# Patient Record
Sex: FEMALE | Race: WHITE
[De-identification: names, ages, dates, MRNs, and addresses within clinical notes are randomized per-mention and may not be internally consistent; named-entity substitution may affect disease eponyms.]

---

## 2019-04-15 ENCOUNTER — HOSPITAL ENCOUNTER (OUTPATIENT)
Dept: HOSPITAL 62 - RAD | Age: 84
End: 2019-04-15
Attending: NURSE PRACTITIONER
Payer: MEDICARE

## 2019-04-15 DIAGNOSIS — E11.622: Primary | ICD-10-CM

## 2019-04-15 DIAGNOSIS — L97.212: ICD-10-CM

## 2019-04-15 LAB
ADD MANUAL DIFF: NO
ALBUMIN SERPL-MCNC: 3.5 G/DL (ref 3.5–5)
ALP SERPL-CCNC: 43 U/L (ref 38–126)
ALT SERPL-CCNC: 21 U/L (ref 9–52)
ANION GAP SERPL CALC-SCNC: 6 MMOL/L (ref 5–19)
AST SERPL-CCNC: 16 U/L (ref 14–36)
BASOPHILS # BLD AUTO: 0 10^3/UL (ref 0–0.2)
BASOPHILS NFR BLD AUTO: 0.4 % (ref 0–2)
BILIRUB DIRECT SERPL-MCNC: 0.2 MG/DL (ref 0–0.4)
BILIRUB SERPL-MCNC: 0.2 MG/DL (ref 0.2–1.3)
BUN SERPL-MCNC: 21 MG/DL (ref 7–20)
CALCIUM: 9.6 MG/DL (ref 8.4–10.2)
CHLORIDE SERPL-SCNC: 98 MMOL/L (ref 98–107)
CO2 SERPL-SCNC: 32 MMOL/L (ref 22–30)
CRP SERPL-MCNC: < 5 MG/L (ref ?–10)
EOSINOPHIL # BLD AUTO: 0.1 10^3/UL (ref 0–0.6)
EOSINOPHIL NFR BLD AUTO: 2.4 % (ref 0–6)
ERYTHROCYTE [DISTWIDTH] IN BLOOD BY AUTOMATED COUNT: 13.4 % (ref 11.5–14)
ERYTHROCYTE [SEDIMENTATION RATE] IN BLOOD: 17 MM/HR (ref 0–30)
GLUCOSE SERPL-MCNC: 118 MG/DL (ref 75–110)
HCT VFR BLD CALC: 35 % (ref 36–47)
HGB BLD-MCNC: 11.8 G/DL (ref 12–15.5)
LYMPHOCYTES # BLD AUTO: 1.9 10^3/UL (ref 0.5–4.7)
LYMPHOCYTES NFR BLD AUTO: 35.3 % (ref 13–45)
MCH RBC QN AUTO: 29.9 PG (ref 27–33.4)
MCHC RBC AUTO-ENTMCNC: 33.7 G/DL (ref 32–36)
MCV RBC AUTO: 89 FL (ref 80–97)
MONOCYTES # BLD AUTO: 0.4 10^3/UL (ref 0.1–1.4)
MONOCYTES NFR BLD AUTO: 8.1 % (ref 3–13)
NEUTROPHILS # BLD AUTO: 2.9 10^3/UL (ref 1.7–8.2)
NEUTS SEG NFR BLD AUTO: 53.8 % (ref 42–78)
PLATELET # BLD: 182 10^3/UL (ref 150–450)
POTASSIUM SERPL-SCNC: 5 MMOL/L (ref 3.6–5)
PROT SERPL-MCNC: 6 G/DL (ref 6.3–8.2)
RBC # BLD AUTO: 3.94 10^6/UL (ref 3.72–5.28)
SODIUM SERPL-SCNC: 135.9 MMOL/L (ref 137–145)
TOTAL CELLS COUNTED % (AUTO): 100 %
WBC # BLD AUTO: 5.4 10^3/UL (ref 4–10.5)

## 2019-04-15 PROCEDURE — 83036 HEMOGLOBIN GLYCOSYLATED A1C: CPT

## 2019-04-15 PROCEDURE — 85025 COMPLETE CBC W/AUTO DIFF WBC: CPT

## 2019-04-15 PROCEDURE — 36415 COLL VENOUS BLD VENIPUNCTURE: CPT

## 2019-04-15 PROCEDURE — 85652 RBC SED RATE AUTOMATED: CPT

## 2019-04-15 PROCEDURE — 93970 EXTREMITY STUDY: CPT

## 2019-04-15 PROCEDURE — 80053 COMPREHEN METABOLIC PANEL: CPT

## 2019-04-15 PROCEDURE — 86140 C-REACTIVE PROTEIN: CPT

## 2019-04-15 PROCEDURE — 93925 LOWER EXTREMITY STUDY: CPT

## 2019-04-16 NOTE — XC[facility] REPORT
[address] [address]

                               Tel: [phone]

                               Fax: [phone]



                       Lower Extremity Venous Evaluation

_______________________________________________________________________________



_______________________________________________________________________________

Procedure: A bilateral duplex scan of the lower extremity veins was performed.

The evaluation included responses to compression and other maneuvers with

patient in the supine and standing positions to assess venous insufficiency.





_______________________________________________________________________________



_______________________________________________________________________________



Right Sided Venous Evaluation

Deep venous system evaluatiion shows patent veins with no obstruction or

significant reflux identified.



Sapheno Femoral junction: no reflux.

Femoral vein reflux: no reflux.

Greater Saphenous vein, Proximal thigh: reflux: no reflux.

Greater Saphenous vein, Distal thigh: reflux: no reflux.

Greater Saphenous vein, Proximal below knee: reflux: no reflux.

Greater Saphenous vein, Distal below knee: reflux: 1 secpnd reflux. 4.8 mm

diameter.

No significant Perforators identified.



Left Sided Venous Evaluation

Deep venous system evaluatiion shows patent veins with no obstruction or

significant reflux identified.



Sapheno Femoral junction: no reflux.

Femoral vein reflux: no reflux.

Greater Saphenous vein, Proximal thigh: reflux: no reflux.

Greater Saphenous vein, Distal thigh: reflux: no reflux.

Greater Saphenous vein, Proximal below knee: reflux: no reflux.

No significant Perforators identified.



_______________________________________________________________________________



Interpretation Summary

No duplex evidence of DVT or obstruction in the bilateral lower extremities.

Minimal area of superficial reflux on the right as noted.

_______________________________________________________________________________



Name: SHAVONNE MCALLISTER

MRN: [id number]

Age: 84 yrs

Gender: Female                                        : 1934

Patient Status: Outpatient                            Patient Location: [facility]

Account #: [id number]

Study Date: 04/15/2019 01:40 PM

                          Accession #: [id number]

Reason For Study: RT CALF ULCER

Ordering Physician: PANCHO COTTER

Performed By: Yobany Moore

Electronically signed by:      Lennox Williams      on 2019 11:15 AM



CC: PANCHO COTTER

>

Williams, Lennox

## 2019-04-16 NOTE — [facility] REPORT
[address] [address]

                               Tel: [phone]

                               Fax: [phone]



                      Lower Extremity Arterial Evaluation

_______________________________________________________________________________



Name: SHAVONNE MCALLISTER

MRN: [id number]                                       Age: 84 yrs

Gender: Female                                        : 1934

Patient Status: Outpatient                            Patient Location: [facility]

Account #: [id number]

Study Date: 04/15/2019 01:14 PM

                          Accession #: [id number]

_______________________________________________________________________________

Procedure: A color flow and duplex scan of the lower extremity arteries was

performed bilaterally with velocity and waveform anaylsis. Ankle brachial

indicies performed.

Reason For Study: RT CALF ULCER







Ordering Physician: PANCHO COTTER

Performed By: Yobany Moore

_______________________________________________________________________________

_______________________________________________________________________________





Measurements and Calculations



                                     Right  Left

                     CFA PSV         128.3 121.5 cm/sec

                     Prox PFA PSV    -78.1 -64.6 cm/sec

                     Prox SFA PSV    64.2   68.1 cm/sec

                     Mid SFA PSV     -74.2 -78.1 cm/sec

                     Dist SFA PSV    -62.5 -68.5 cm/sec

                     Prox Pop A PSV  45.7   54.7 cm/sec

                     Dist ANISA PSV    30.3   35.9 cm/sec

                     Mid PTA PSV            91.0 cm/sec

                     Dist PTA PSV    48.8  106.9 cm/sec

                     Perez Pedis PSV   10.8   18.3 cm/sec



_______________________________________________________________________________

Right Side Arterial Evaluation

Normal velocity and triphasic waveforms noted from the Common Femoral artery

to the Posterior Tibial . Triphasic phasic with low velocity in the Anterior

Tibial and Dorsalis Pedis arteries.

Ankle Brachial index 1.19.



Left Side Arterial Evaluation

Normal velocity and triphasic waveforms noted from the Common Femoral artery

to the infrageniculate vessels . Biphasic with low velocity in the Dorsalis

Pedis arteries.

Ankle Brachial index 1.27.



_______________________________________________________________________________

Interpretation Summary

Mild hemodynamically significant lesions in the bilateral lower extremities,

on duplex imaging, at rest. Some abnormality in distal vessel, excellent

compensation evident, for overall mild consequence.

JHONY's suggest no significant disease.

_______________________________________________________________________________

Electronically signed by:      Lennox Williams      on 2019 11:29 AM



CC: PANCHO COTTER

>

Williams, Lennox